# Patient Record
Sex: MALE | Race: WHITE
[De-identification: names, ages, dates, MRNs, and addresses within clinical notes are randomized per-mention and may not be internally consistent; named-entity substitution may affect disease eponyms.]

---

## 2021-09-25 ENCOUNTER — HOSPITAL ENCOUNTER (INPATIENT)
Dept: HOSPITAL 46 - ED | Age: 71
LOS: 6 days | Discharge: NURSING, REHAB OR LONG-TERM CARE | DRG: 177 | End: 2021-10-01
Attending: INTERNAL MEDICINE | Admitting: INTERNAL MEDICINE
Payer: OTHER GOVERNMENT

## 2021-09-25 VITALS — WEIGHT: 148.59 LBS | HEIGHT: 64 IN | BODY MASS INDEX: 25.37 KG/M2

## 2021-09-25 DIAGNOSIS — G89.29: ICD-10-CM

## 2021-09-25 DIAGNOSIS — U07.1: ICD-10-CM

## 2021-09-25 DIAGNOSIS — Z79.899: ICD-10-CM

## 2021-09-25 DIAGNOSIS — F03.90: ICD-10-CM

## 2021-09-25 DIAGNOSIS — F39: ICD-10-CM

## 2021-09-25 DIAGNOSIS — J96.01: ICD-10-CM

## 2021-09-25 DIAGNOSIS — F11.20: ICD-10-CM

## 2021-09-25 DIAGNOSIS — G92.8: ICD-10-CM

## 2021-09-25 DIAGNOSIS — J69.0: Primary | ICD-10-CM

## 2021-09-25 DIAGNOSIS — N40.0: ICD-10-CM

## 2021-09-25 DIAGNOSIS — N17.9: ICD-10-CM

## 2021-09-25 PROCEDURE — G0378 HOSPITAL OBSERVATION PER HR: HCPCS

## 2021-09-25 PROCEDURE — C9803 HOPD COVID-19 SPEC COLLECT: HCPCS

## 2021-09-25 PROCEDURE — U0003 INFECTIOUS AGENT DETECTION BY NUCLEIC ACID (DNA OR RNA); SEVERE ACUTE RESPIRATORY SYNDROME CORONAVIRUS 2 (SARS-COV-2) (CORONAVIRUS DISEASE [COVID-19]), AMPLIFIED PROBE TECHNIQUE, MAKING USE OF HIGH THROUGHPUT TECHNOLOGIES AS DESCRIBED BY CMS-2020-01-R: HCPCS

## 2021-09-26 NOTE — EKG
[facility]
                                    [address] [facility] [address]
                                  [place], [place]  [address]
_________________________________________________________________________________________
                                                                 Signed   
 
 
Normal sinus rhythm
Nonspecific T wave abnormality
Prolonged QT
Abnormal ECG
No previous ECGs available
Confirmed by MEL ROSE DO ([id number]) on 9/26/2021 5:31:58 PM
 
 
 
 
 
 
 
 
 
 
 
 
 
 
 
 
 
 
 
 
 
 
 
 
 
 
 
 
 
 
 
 
 
 
 
 
 
 
 
    Electronically Signed By: MEL ROSE DO  09/26/21 1732
_________________________________________________________________________________________
PATIENT NAME:     SUMMER AMIN                         
MEDICAL RECORD #: [id number]                     Electrocardiogram             
          ACCT #: [id number]  
DATE OF BIRTH:   [birth year]                                       
PHYSICIAN:   MEL ROSE DO                     REPORT #: [id number]
REPORT IS CONFIDENTIAL AND NOT TO BE RELEASED WITHOUT AUTHORIZATION

## 2021-09-26 NOTE — NUR
PT APPEARS TO BE FEELING A LITTLE AGITATED, PATIENT CONCERNED THAT HE WILL BE
WITHOUT HIS "PILL" PATIENT ASKED IF HE IS REFERRING TO HIS METHADONE, SAYS
"YES" AND HE HAS BEEN TAKING "IT FOR A LONG TIME, DON'T LIKE HOW IT FEELS WHEN
WITHOUT IT" PT HAS PRN ORDER FOR OXYCODONE 5MG AS NEEDED FOR PAIN OR
WITHDRAWL. SEE EMAR. PT MEDICATED. CONTINUING TO REINFORCE TO PATIENT TO KEEP
OXYGEN ON. PT CAN BE HEARD HAVING NUMEROUS COUGHING EPISODES.

## 2021-09-26 NOTE — NUR
AGITATED, ATTEMPTIMG TO GET OOB. TRYING TO REASON WITH PATIENT. PATIENT NOW
SITTING AT BED SIDE. RN IN ROOM FOR PATIENT SAFETY. NO RESP DISTRESS NOTED.
YANG CATH PATENT. IVF ON HOLD AT THIS TIME AS PATIENT PULLING AT IV TUBING.

## 2021-09-26 NOTE — NUR
PATIENT REMAINS AWAKE WATCHING TV, REINFORCED TO PATIENT TO TRY TO KEEP HIS
NASAL CANNUAL IN AS HIS SATS WILL GO DOWN TO 88% AT TIMES. PT DENIES ANY
NEEDS. DENIES PAIN AND NAUSEA. ATENDS ARE CLEAN AND DRY. PT SIPPING WATER.

## 2021-09-26 NOTE — NUR
PT CONTINUES TO GRAB AT CARDIAC LEADS AND MESS WITH HIS OXMETER ON FINGER AS
WELL AS NASAL CANNUAL. REPLACED THE CARDIAC LEAD ELECTRODE PADS. REINFORCED TO
PATIENT THE NEED FOR THESE TO SATY ON PATIENT VERBALIZED "OK" PT IS NOT
COMBATIVE. PT HAS ALSO HAD ANOTHER WET ATTENDS.

## 2021-09-26 NOTE — NUR
PATIENT ASSESSMENT COMPLETE. WHEN THIS RN WENT INTO ROOM PATIENT WAS NOT
WEARING HIS NASAL CANNULA SATS WOULD FLUCTUATE FROM 88% TO LOW 90'S,
ENCOURAGED PATIENT TO KEEP IT ON. PATIENT HAS VOIDED  A LARGE AMOUNT IN HIS
ATTENDS. CLEANED PT AND APPLIED DRY ATTENDS. EDUCATED PT ON USING CALL LIGHT
AS HE FELS NEED TO VOID TO ATTEMPT TO USE URINAL.  PT GIVEN WATER TOD RINK
WITH EVENING MEDS. DENIES PAIN. PATIENT CONTINUES TO PULL AT WIRES AND CORDS
INSTRUCTED TO NOT TAKE THESE OFF.

## 2021-09-26 NOTE — NUR
INCREASED OXYMASK SETTING TO 7 LITERS TO MAINTAIN SATS IN THE 90'S. PT WILL
FALL ASLEEP AND HIS SATS WILL DROP INTO THE HIH 80'S ON 5 LITER OXYMASK.

## 2021-09-26 NOTE — NUR
PULLED OFF LEADS, PULLING AT YANG CATH. IVF REMAIN HELD DUE TO PATIENTS
FRUSTRATION. PATIENT WILL TALK AND ABLE TO UNDERSTAND WHAT HE IS SAYING BUT IS
NOT MAKING SENSE MOST OF THE TIME. MORPHINE 2 MG IV GIVEN AS PER ORDERS.

## 2021-09-26 NOTE — NUR
PT ASSESSMENT COMPLETE. WHEN THIS RN WENT INTO ROOM, PATIENT HAD HIS OXYMASK
OFF HIS FACE, SATS DROPPED INTO MID 80'S. REINFORCED TO PATIENT THAT HE NEEDS
OXYGEN AT THIS TIME AND THAT HE NEEDS TO KEEP O2 IN PLACE TO MAINTAIN HIS
SATS. PT VERBALIZED UNDERSTANDING, BUT CONTNUES TO GRAB AT MASK, ALSO HAS BEEN
FIGETING WITH SAT PROBE TO FINGER.  KEPT OXYMASK AT 7 LITERS.

## 2021-09-26 NOTE — NUR
REFUSED DINNER. DENIES PAIN. HAS BEEM MORE CALM SINCE AROUND 1600 TODAY.
CONTINUES WITH HARSH COUGH. NO FUTHER CHANGES.

## 2021-09-26 NOTE — NUR
REPORT RECEIVED FROM RICK MORRELL FROM ER. REPORTS THAT PT HAS DEMENTIA HAS
CAREGIVER. PT HAD BM IN ER, IS INCONTINENT. HAS YANG IN PLACE. ON OXY MASK TO
KEEP SATS IN THE 90'S. PT BROUGHT TO CCU BY NURSING SUPERVISOR TONYA. PATIENT
WAS BEING TRANSFERRED TO BED AND HAD SMALL SOFT FORMED STOOL. PT CLEANED AND
PLACE ON CCU BED.

## 2021-09-26 NOTE — NUR
RECEIVED REPORT FROM DAY SHIFT NURSES. MARISOL HAD DECENT MORNING BUT IN THE
AFTERNOON WAS TRYING TO GET OUT OF BED PULL IV'S AND CORDS. YANG WAS REMOVED
AT 1400 HAS NOT VOIDED YET HAS ATTENDS IN PLACE. PT WAS GIVEN MORPHINE TO
RELAX HIM AND HE TOOK HIS USUAL PO MEDS. HAS 2 LO2 AT THIS TIME FOR ROOM AIR
SAT 88%, PT HAD BEEN OF O2 FOR A GOOD PART OF DAY. NICOTINE PATCH IN PLACE.
BED LARM ON.

## 2021-09-26 NOTE — NUR
REFUSING TO TAKE PO MEDICATIONS. DR. ROSE UPDATEDE VIA PHONE REGARDING
PATIENT AGITATION AND CONFUSION. ORDERS RECIEVED.

## 2021-09-26 NOTE — NUR
CONTINUES TO SIT AT BEDSIDE. NOT ON MONITOR,OXIMETER, OR IVF. GIVING PATIENT
TIME TO RELAX. HAS BEEN PULLING AT IV SITES AT TIMES. RN IN ROOM.

## 2021-09-27 NOTE — NUR
PT INSISTED ON STANDING TO PEE GOT PFF BED DESPITE SATTF TELLING HIM HE NEES
TO WAIT FOR ASSISTANCE, PT IS STILL AGITATED. DEB RN GAVE REST OF HALDOL IV
3MG FOR HIS AGITATION. AR STILL SPEAKING LOAUD AND AGITATED, CONFUSE NOT
ANTING TO GET BACK IN BED PT DID VOID IN URINAL.

## 2021-09-27 NOTE — NUR
RECEIVED REPORT FROM [facility]. pt RESTING IN BED, QUIET MOVEMENTS AT THIS
TIME. PRECEDEX GTT AT 0.6 HR 60 - 70S. SOME ECG LEADS OFF. IVF AND MAG
INFUSING PER ORDERS. BED ALARM ON.

## 2021-09-27 NOTE — NUR
IN TO DO ASSESSMENT. pt SITTING UP IN BED. IMMEDIATELY STATED "GET OUT OF
HERE" OFFERED MEDICATIONS pt AGREABLE TO TAKE. PICKED OUT THE ANTIBIOTIC AND
HANDED IT BACK, LOUDLY REFUSED WHEN EDUCATION WAS DONE. pt PULLED ALL LEADS
OFF. PRECEDEX TITRATED TO 0.8MCG/KG/HR. pt UNCOOPERATIVE WITH CARES. PUSHED
THIS RN AWAY AND ASKED LOUDLY TO BE LEFT ALONE. ABLE TO REPLACE A FEW ECG
ELECTRODES. HR 50'S. TITRATED PRECEDEX TO 0.7MCG/KG/HR TO MAINTAIN HR >50. pt
CONTINUES TO MOVE QUIETLY IN BED. WAKES QUICKLY TO TOUCH AND ASKS TO BE LEFT
ALONE. LIMITED ASSESSMENT DONE. IV IN RIGHT FOOT PATENT, BLOOD RETURN NOTED,
FLUSHED WELL. RESPIRATIONS REGULAR, pt REFUSES O2 SAT AND OXYGEN. pt ABLE TO
MOVE ALL EXTREMITIES INDEPENDENTLY. CURRENTLY RESTING IN BED FLAT SNORING AT
TIMES. WAKES TO LIGHT NOISE IN ROOM AND ASKS TO BE LEFT ANGELIQUE. HR IRREGULAR ON
MONITOR. PRECEDEX TITRATED TO 0.6 MCG/KG/HR AS pt IS NOW SETTLED WITH NIGHTLY
MEDICATIONS. BED ALARM ON.

## 2021-09-27 NOTE — NUR
NO IV SITES UPON THIS ASSESSMENT. IV SITES WERE LOST PRIOR TO THIS SHIFT. WILL
ATTEMPT ANOTHER IV SITE WHEN PATIENT MORE RELAXED. PATIENT IS VERY ANGERY AND
AGITATED AT THIS TIME.

## 2021-09-27 NOTE — NUR
PT GIVEN 2MG HALDOL IV PER DR ROSE VERBAL ORDER FOR AGITATION, PATIENT HAS
BEEN ASSISTED BACK TO BED BY JAYDE FROM SECURITY. PT CONTINUES TO SPEW
PROFANITIES AND MAKE THREATS, BUT IS NOT TRYING TO PHYSICALLY HURT STAFF, PT
APPEARS PARANOID.

## 2021-09-27 NOTE — NUR
in room now that patient has relaxed after haldol. assessed patient's iv's due
to his wreslting around iv's are no longer patent blood at sites. removed both
iv's to right hand placed guaze and coban on sites. patient is uncooperative
and shouting at this RN, attempted to do a straight stick but patient is more
irrate and states "you guys aren't touching me" "i don't need that shit"
unable to collect labs. patient does not have iv at this time.

## 2021-09-27 NOTE — NUR
HALDOL 5 MG IV GIVEN WHILE PATIENT STANDING UP IN BATHROOM. 2 RNS IN ROOM TO
TRY AND DIRECT PATIENT.

## 2021-09-27 NOTE — NUR
BED ALARM GOING OFF. PATIENT STANDING AT BEDSIDE SAYING HE NEEDS TO PEE, INC
OF SOME URINE, ALSO VOIDED TO URINAL. HAVING DIFFICULTY MANAGING HIMSELF. IS
EXTREMELY FRUSTRATED, ANXIOUS, AGITATED. HALDOL 5 MG IV GIVEN.

## 2021-09-27 NOTE — NUR
DEIDRA RN WAS IN ROOM WITH PATIENT WHO WAS TRYING TO GET OUT OF BED AND BED
ALARM WENT OFF YELLING "I HAVE TO PEE" HOWEVER PT SOILED HIS ATTENDS. PT
APPEARS TO BE VERY AGITATED WANTS TO STAND AND WALK AROUND ROOM. PATIENT
INFORMED HE HAS COVID AND IS IN THE HOSPITAL AND WEAK, IT IS NOT SAFE FOR HIM
TO BE WALKING AROUND ROOM BY SELF. PT VERY AGITATED AND SPEWING PROFANITIES AT
STAFF. REFUSING TO GET BACK IN BED. PT HAS COUGHING EPISODE WHILE SITTING ON
BED AND SATS DROPPED, PT INSTRUCTED TO KEEP HIS O2 ON, HE KEEPS TAKING IT OFF.
JAYDE FROM [employer] IN ROOM DUE TO PATIENT ESCALATING

## 2021-09-27 NOTE — NUR
Pt left a voice message requesting a refill. Last visit 03/22/2021 MAMIE Cantu   Next appointment 06/24/2021 NP Claire Cantu   Previous refill encounter(s)   01/13/2021 Januvia #[id number]     Requested Prescriptions     Pending Prescriptions Disp Refills    SITagliptin (Januvia) 100 mg tablet 90 Tab 1     Sig: Take 1 Tab by mouth daily. assessment done as patient as patient awake and attempting to get oob.
temp-100.6.

## 2021-09-27 NOTE — NUR
PATIENT SEVERELY AGITATED AND ABUSIVE TOWARD STAFF. SECURITY CALLED. ATTEMPTED
TO REDIRECT PATIENT FAILED. PATIENT'S NEXT OF KIN CALLED WITH NO ANSWER.
DISCUSSED WITH MD. RECEIVED ORDERS FOR PRN HALDOL. REVIEW ORDERS IN EMAR.

## 2021-09-27 NOTE — EKG
[facility]
                                    [address] [facility] [address]
                                  [place]  [address]
_________________________________________________________________________________________
                                                                 Signed   
 
 
Normal sinus rhythm
Normal ECG
When compared with ECG of 25-SEP-2021 21:24,
Nonspecific T wave abnormality no longer evident in Inferior leads
Nonspecific T wave abnormality no longer evident in Anterior leads
Confirmed by MEL ROSE DO ([id number]) on 9/27/2021 8:10:42 PM
 
 
 
 
 
 
 
 
 
 
 
 
 
 
 
 
 
 
 
 
 
 
 
 
 
 
 
 
 
 
 
 
 
 
 
 
 
 
 
    Electronically Signed By: MEL ROSE DO  09/27/21 2011
_________________________________________________________________________________________
PATIENT NAME:     SUMMER AMIN                         
MEDICAL RECORD #: [id number]                     Electrocardiogram             
          ACCT #: [id number]  
DATE OF BIRTH:   [birth year]                                       
PHYSICIAN:   MEL ROSE DO                     REPORT #: [id number]
REPORT IS CONFIDENTIAL AND NOT TO BE RELEASED WITHOUT AUTHORIZATION

## 2021-09-27 NOTE — NUR
REMAINS VERY DIFFICULT TO CARE FR AS WILL NOT COOPERATIVE WITH TREATMENT.
SITTING UP IN BED, YELLING OUT DON'T TOUCH ME. TUGGING AT IV TUBING. O2 OFF,
OXIMETER OFF. I CAN NOT REAPPY AT THIS TIME AS PATIENT AGITATED.

## 2021-09-27 NOTE — NUR
IN TO ASSESS. pt RESTING FLAT IN BED. WOKE TO VOICE. ALLOWED THIS RN TO TAKE
BP AND REPLACE LEADS. DID NOT ACTIVELY MOVE AWAY. DID NOT ALLOW ANY OTHER
INTERVENTIONS, REQUESTED TO BE LEFT ALONE. pt DENIED PAIN AT THIS TIME. BED
ALARM ON.

## 2021-09-27 NOTE — NUR
INC OF URINE. SITTTING AT BEDSIDE. UABLE TO REASON WITH PATIENT. YELLS OUT,
LEAVE ME ALONE, DON'T TOUCH ME! I DONT' WANT ANY HELP. THEN STOOD. VERY
UNSTABLE ON FEET.

## 2021-09-27 NOTE — NUR
report recieved. patient is resting in bed at this time. not awakened for
assessment. no distress noted.

## 2021-09-27 NOTE — NUR
PT ASLEEP ON BACK PATIENT WILL PULL NASAL CANNULA OFF SATS WILL MAINTAIN IN
THE 90'S. HR  IN THE 60'S, RR 20. VISUALIZED EVEN RISE AND FALL OF CHEST.

## 2021-09-27 NOTE — NUR
awake, BED ALARM GOING OFF. PATIENT STANDING AT BEDSIDE SAYING HE NEEDS TO
PEE. INC OF URINE. WAS ABLE TO VOID SMALL AMOUNT OF URINE TO URINAL. NOT ABLE
TO DIRECT PATIENT. PATIENT IS VERY UNSTEADY ON FEET. WAS FINALLY ABLE TO GET
PATIENT BACK TO BED. I ATTEMPTED 3 TIMES TO START IV W/O SUCCESS. SUPERVISIOR
HERE TO START IV AND DRAW LABS. THIS PROCESS TOOK APPROX 2.5 HOURS AS PATIENT
WAS UP WALKING IN ROOM, WAS HOLDING ON TO BED, BEDSIDE STAND,WALLS, BATHROOM
RAILS. WHEN ATTEMPTING TO HELP PATIENT, PATIENT WOULD YELL OUT "LEAVE ME
ALONE,GET YOUR HANDS OFF OF ME" PATIENT IS VERY FRUSTRATED.

## 2021-09-27 NOTE — NUR
PT CONTINUES TO BE VERBALLY AGGRESSIVE AT STAFF REFUSING TO GET BACK ON THE
BED. STATES "DON'T PUT YOUR FUCKING HANDS ON ME!" THIS RN AND JAYDE FROM
SECURITY ENCOURAGING PT TO GET BACK ON THE BED FOR HIS SAFETY.

## 2021-09-28 PROCEDURE — 8E0ZXY6 ISOLATION: ICD-10-PCS | Performed by: INTERNAL MEDICINE

## 2021-09-28 NOTE — NUR
BED ALARM SET OFF.  IN TO ASSIST PT WHO STOOD AT SIDE OF BED TO VOID AND THEN
RETURNED TO BED.  WARM BLANKET PROVIDED PER REQUEST.  BED ALARM SET FOR
SAFETY.

## 2021-09-28 NOTE — NUR
THIS RN IN PTS ROOM TO SWITCH FELICE RN. PT WANTING TO SIT ON EDGE OF BED. PT
APPEARS ANXIOUS AND AGITATED. THIS RN ABLE TO GET PT BACK INTO BED AFTER
FEEDING HIM A COUPLE BITES OF APPLESAUCE AND A FEW SIPS OF WATER- BEDSIDE
SWALLOW EVAL- WHEN PT WAS LAID BACK INTO BED HE HAD A COUGHIN GFIT THAT
REQUIRED HIM TO SIT BACK UP. EVENTUALLY PT APPEARED TIRED ENOUGH AND WAS ABLE
TO LAY BACK DOWN WITH NO COUGHING FIT.

## 2021-09-28 NOTE — NUR
BLADE AND THIS NURSE ASSISTED PT. WITH EXERCISES. PT. VOIDED 100ML AND
INCONTINENT VOID ON CHUX. PT. ABLE TO STAND SEVERAL TIMES WITH 2PA AND FWW.
PT. REFUSING BP AND TEMP CHECKS, CARDIAC MONITOR. PT. ASSISTED WITH EATING
SNACK. LEFT RESTING WITH BED ALARM ON AND CURTAIN OPEN.

## 2021-09-28 NOTE — NUR
PATIENT IS NOT ABLE TO HAVE A CONVERSATION VIA PHONE PER STAFF.  CALLED AND
SPOKE WITH WIFE MARCO EVANS.  WE DISCUSSED HER CONCERNS, QUESTIONS, AND WHAT
IS IN PLAY WITH [facility].  PATIENT SEEMS CLOSE TO BASELINE WITH
HIS MENTATION.  SHE STATES SHE HAS BEEN WORKING WITH  AT
[facility] FOR POSSIBLE HELP WITH CARE AT HOME.  SHE STATES PATIENT HAS
DEMENTIA, IS A ADDICT USING OPIODS AND ALCOHOL IN THE PAST.  HE IS CURRENTLY
ENROLLED AT [facility] AND THEIR [facility].  SHE STATES SHE DOES ALL
HIS MEDICATIONS AND MONITORS HIM VERY CLOSELY WITH THEM.  SHE STATES THEY
MOVED HERE FROM THE [facility] AND SHE HAS BEEN TRYING TO GET HIM INTO THE
[facility] T[facility] CL[facility]IC IN [place] SO THEY DON'T HAVE TO TRAVEL TO
[place].  SHE STATES SHE HAS HAD TROUBLE GETTING THROUGH WITH CLINIC HERE.
SHE STATES SHE HAS BEEN WORKING TOWARDS GETTING HELP IN THE HOME.  SHE IS WITH
PATIENT 24/7.  SHE STATES HE CANNOT BE LEFT ALONE AT ALL.  SHE STATES HE HAS
DEMENTIA, IS AN ADDICT, AND WAS INCARCERATED FOR YEARS.  HE IS PTSD.  HE SEES
A NEUROLOGIST IN [facility] DR [place]TIZ X [phone] AND HAD A NEURO ASSESSMENT
AND SCAN A WEEK AGO.  HE ALSO WORKS WITH PSYCHIATRIST DR PAULA ELISE
[facility] X [phone].  HER ASSISTANT IS ELIJAH BLUE [id number].  PATIENT GOES
THROUGH ORTEGA SCOTT AT [facility] OPIOD CL[facility]IC [id number].  THE COMMUNITY
COORDINATOR SHE HAS BEEN WORKING WITH FROM [facility] IS PENNIE JURADO X
[id number].  SHE STATES THEY DID A GEC RECENTLY.
 
SHE STATES SHE CAN TAKE PATIENT HOME IF HE IS ABLE TO AMBULATE AND GET TO
BATHROOM ETC.  SHE IS VERY CONCERNED ABOUT DOSING OF [facility].  SHE HAS MANY
QUESTIONS REGARDING THIS.  I WAS ABLE TO ANSWER MOST BY CHART NOTES.  SHE IS
NOT SURE PATIENT HAS COVID.  STATES THEY HAVE AVOIDED CONTACT WITH PEOPLE,
USES MANY SANITIZERS AT HOME AND ALWAYS WEAR A MASK.  SHE STATES THEY ONLY
THING THEY HAVE DONE RECENTLY IS THE [facility] VISITS, AND THEY WENT TO [place]OUND[place]
LOCAL [place] LAST THURSDAY.  SHE STATES THEY BOTH WORE A MASK THERE.  SHE IS
CONCERNED THE TEST IS WRONG.  SHE HAS GONE TO GET TESTED, HAS NOT GOT RESULTS
BACK YET.  SHE STATES SHE WANTS PATIENT TO BE TESTED AGAIN, AND NOT A RAPID
TEST.
SHE ALSO STATES PATIENTS MEDICAL TEAM IN [place] IS THE STRENGTH TEAM.
THIS INCLUDES PCP BENNIE SCHOFIELD AND RN [facility].
 
WIFE VENTED MANY FRUSTRATIONS AND CONCERNS.  CONVERSATION WAS 1.5 HOURS.  I
DISCUSSED I WILL TRY TO CONTACT [facility] TO SEE WHERE THEY ARE AT WITH COMMUNITY
RESOURCES.  ALSO THAT OUR HOSPITALIST CHANGE TODAY AND DR ACUNA WILL BE HERE
AND TOMORROW WE WILL CALL HER TO UPDATE HER.  SHE STATES SHE IS MUCH RELEAVED
TO HAVE GOTTEN MY CALL AND SHE IS LOOKING FORWARD TO SPEAK WITH US AGAIN
TOMORROW.

## 2021-09-28 NOTE — NUR
PT ATTEMPTING TO GET OUT OF BED. THIS NURSE IN THE ROOM TO ASSIST. PT. REFUSES
HELP. WALKER GIVEN TO PT. HE HAS DIFFICULTY STANDING AND STATES HE NEEDS TO
VOID SITTING AT EOB WITH STAFF IN THE ROOM. ON RA AND O2 SAT REMAINS GREATER
THAN 90%.

## 2021-09-28 NOTE — NUR
BED ALARMING. pt UP TO URINATE WALKED TO BATHROOM UNSTEADY ON FEET. BACK TO
BED. REPEATED "I'M FINE, I CAN DO IT." REFUSED ASSISTANCE. ABLE TO VOID TWICE.
SOME INCONTINENCE. RESTING IN BED WITH BED ALARM ON.

## 2021-09-28 NOTE — NUR
THIS RN ORGINALLY IN PTS ROOM TO START PTS ANTIBIOTICS. PT THEN WOKE UP AND
STATED THAT HE NEEDED TO PEE. THIS RN USED COLLINDER AND PT DRIBBLED 50ML INTO
IT. PT THEN LAID BACK DOWN, THIS RN DID ASSESSMENT, PT THEN STATED THAT HE
WANTED TO GET UP TO PEE. THIS RN ASSISTED PT TO EDGE OF BED, PT THEN REFUSED
TO STAND UP TO PEE- PT SAT ON EDGE OF BED FOR 10MINS PRIOR TO LAYING BACK
DOWN, PT APPEARED COLD. THIS RN TOOK PTS TEMP- 99.6 ORALLY. THIS RN ALSO TOOK
PTS BP- PTS ARMS SHAKING DURING PROCESS- DIASTOLIC HIGH FOR EVERY BP TAKEN. PT
APPEARED EXHAUSATED AND HENCE WHY PT DID NOT GET OUT OF BED.
PT THEN STATED THAT HE NEEDED TO PEE AGAIN, THIS RN ASSISTED PT TO PEE USING
COLLINDER WHILE IN BED, PT AGAIN PEED 50, PTS BLADDER FEELS FULL- JACINTO RN
DISCUSSED WITH CLARA FOR THIS RN TO STRAIGHT CATH PT
THIS RN STRAIGHT CATHED PT- PT DID NOT TOLERATE WELL AND STERILE FEILD WAS
BROKEN BY PT DUE TO NONCOMPLIANCE AND UNABLE TO UNDERSTAND- PT KEPT YELLING
"YOU FUCKING BITCH" AND "I GOTTA PEE" THIS RN ABLE TO OBTAIN 250ML URINE OUT,
PT EVENTUALLY ABLE TO SETTLE DOWN AFTER BLADDER EMTIED. THIS RN DID MEET
RESISTANCE DURING STRAIGHT CATH PROCEDURE.

## 2021-09-28 NOTE — NUR
PATIENT BED ALARM ACTIVATED. PATIENT REFUSED TO LIE DOWN. PATIENT WANTED TO
SIT ON SIDE OF THE BED. PATIENT WAS SUPERVISED FOR THE NEXT HOUR UNTIL NURSE
CAME IN TO GIVE MEDS AND PATIENT LIED DOWN.

## 2021-09-28 NOTE — NUR
REPORT RECEIVED FROM NIGHT RN AND PT. CARE RESUMED. BED ALARM SOUNDING. PT. IS
STANDING. PT. TOLD THIS NURSE HE DOES NOT NEED HELP AND TO LEAVE. PT. ASSISTED
WITH AMBULATING A SHORT DISTANCE AND THEN VOIDING 100ML IN CYLINDER. HE ALSO
HAD AN INCONTINENT SMALL VOID AND CHUX CHANGED. PT. IS ORIENTED TO SELF ONLY.
HE REFUSES CARDIAC MONITOR, BP AND TEMP. 02 SAT IS 90% ON RA. IV SITES APPEAR
WNL AND SL. LUNGS DIM. IN THE BASES. ALL MORNING MEDS TAKEN EXCEPT LOVENOX.
PT. AGGITATED AT TIMES AND STATED "THAT'S ENOUGH BITCH". THIS NURSE TOLD THE
PATIENT HIS LANGUAGE WAS NOT APPROPRIATE AND HE STATES "OK, SORRY".  PT. LEFT
RESTING WITH BED ALARM ON AND CURTAIN OPEN.

## 2021-09-28 NOTE — NUR
PATIENT BED ALARM ACTIVATED. PT. TRYING TO LEAVE BED TO USE BATHROOM. PATIENT
ASSISTED WITH BR ASSISTANCE.
BRIEF CHANGED, ROOM TIDIED, BED ALARM ACTIVATED.

## 2021-09-28 NOTE — NUR
TRIED MULTIPLE TIMES TODAY TO GET THROUGH TO THE [facility].  HAD OUR CHW
TRY ALSO AND SHE WAS NOT ABLE TO GET THROUGH.  SHE CALLED [facility] AND THEY
ARE CHECKING ON PHONE ISSUE.

## 2021-09-28 NOTE — NUR
THIS CNA IN ROOM TO ASSIST RN FELICE. PATIENT UP TO BSC, UNABLE TO HAVE BM. BACK
TO BED, WARM BLANKETS PROVIDED. BED ALARM ON FOR SAFETY. CALL LIGHT IN REACH

## 2021-09-28 NOTE — NUR
BED ALARMING. THIS RN IN TO ASSIST. pt SITTING ON SIDE OF BED. DENIES NEEDS.
SLEEPING AT TIMES. BASHIR CNA IN ROOM 1:1 WITH pt.

## 2021-09-28 NOTE — NUR
BED ALARMING. IN TO ASSIST. pt IMMEDIATELY SHOUTED "I'M FINE I DON'T NEED
HELP" SL FOR pt SAFETY. pt WAS ABLE TO VOID IN URINAL WITHOUT ASSISTANCE. GOT
HIMSELF BACK TO BED. ALLOWED THIS RN TO TAKE TEMPERATURE. TRIED A BLOOD
PRESSURE BUT pt SHOUTED "IT HURTS STOP IT!" pt REFUSED TO BE ASSESSED. ALL
MONITORS OFF AT THIS TIME. RESTING IN BED WITH BED ALARM ON.

## 2021-09-28 NOTE — NUR
PT. WAS ATTEMPTING TO STAND. THIS NURSE ASSISTED PT. WITH STANDING. HE COULD
NOT STATE WHERE HE WANTED TO GO AND WAS UNABLE TO TAKE STEPS. SAT AT THE EOB
FOR SEVERAL MINUTES. 02 SAT WAS 92% ON RA. PT. ASSISTED WITH REPOSITIONING IN
BED. LEFT RESTING WITH ALARM ON.

## 2021-09-28 NOTE — NUR
THIS RN BACK IN PTS ROOM WITH TREVA HENRY DUE TO PT ATTEMPTING TO GET OUT OF
BED. PT WAS INCONTINENT OF URINE AT THIS TIME. COMPLETE BED CHANGE DONE, PT
TOELRATED OKAY- PT UNABLE TO VOID INTO CYLINDER BUT WAS ABLE TO STAND TWICE TO
ALLOW BED CHANGE. PT BACK TO BED AND APPEARS TO BE MORE COMFORTABLE AT THIS
TIME WITH WARM BLANKETS PLACED FOR COMFORT.
PT NOT AGREEABLE TO PULSE OX AT THIS TIME.

## 2021-09-28 NOTE — NUR
RECEIVED REPORT FROM LANA MORRELL. pt WAS RECENTLY UP TO VOID AND BACK TO BED. CNA
IN ROOM TO ASSIST. BED ALARM ON FOR SAFETY.

## 2021-09-28 NOTE — NUR
PT. HAD AN INCONTINENT VOID IN BED AND ALSO VOIDED 50ML IN CYLINDER. PT.
STATES HE HAS TO HAVE BM. THIS NURSE ASSISTED PT. TO THE BEDSIDE COMMODE, BUT
HE WOULD NOT AMBULATE OR LET GO OF THE BEDRAIL AND DID NOT WANT TO BE TOUCHED.
2PA TO THE COMMODE. BEDDING CHANGED AND PT. ASSISTED WITH WASHING HANDS AND
FACE. LEFT RESTING IN BED WITH ALARM ON AND CURTAIN OPEN.

## 2021-09-28 NOTE — NUR
pt MOVING IN BED. THIS RN IN TO ASSIST. pt HAD TO URINATE. ASSISTED TO SIDE OF
BED. pt WAS INCONT OF URINE. CHUX CHANGED. pt PUSHED WITH RN AWAY STATING "I
DON'T NEED HELP I'M FINE" VERY UNSTEADY ON FEET. VOIDED INTO URINAL. pt BACK
TO BED. REFUSED TO COOPERATE WITH CARES. REQUESTED STAFF LEAVE HIM ALONE. IVF
INFUSING. RIGHT FOOT IV SL. BED ALARM ON.

## 2021-09-28 NOTE — NUR
PT. REQUESTED TO HAVE UNDERWEAR ON. ASSISTED WITH PLACING ATTENDS ON.
PT. HELPED WITH
REPOSITIONING AND BROUGHT BLANKETS. DENIES FURTHER NEEDS. PT. LEFT RESTING
WITH ALARM ON AND CURTAIN OPEN.

## 2021-09-28 NOTE — NUR
ROUNDED ON pt. RESTING IN BED, RESPIRATIONS REGULAR AND UNLABORED. ALL
MONITORS REMAIN OFF PER pt REQUEST. BED ALARM ON.

## 2021-09-28 NOTE — NUR
this rn updated pts wife/ caregiver. she was unhappy that pt had been placed
on pain meds. this rn discussed with her that she could come in and sit with
pt due to pt getting agitated when he has to pee, she stated she can come for
a bit in the am

## 2021-09-28 NOTE — NUR
BED ALARMING. pt TRYING TO SIT UP. INCONT OF URINE WAS ABLE TO STAND A VOID
SOME IN URINAL. SITTING ON EDGE OF BED. WAS ABLE TO TAKE MEDICATIONS AT THIS
TIME. BASHIR, CNA IN ROOM WITH pt.

## 2021-09-28 NOTE — NUR
pt SET OFF BED ALARM CNA IN ROOM TO ASSIST. pt ABLE TO SIT ON THE SIDE OF THE
BED, CONTINUES TO REPEAT "I'M OKAY, I DON'T NEED HELP" AFTER A TIME pt WAS
ABLE TO SETTLE SELF BACK IN BED. LIMITED ASSESSMENT DONE. LUNGS CLEAR IN THE
UPPERS, RIGHT LOWER DIM. BOWEL TONES ACTIVE. pt DOES HAVE A DEPEND IN PLACE.
pt BECAME AGITATED WITH CARES, ANSWERED SOME QUESTIONS. REPORTED PAIN "ALL
OVER" OFFERED NIGHTLY MEDICATIONS. pt ASKED THAT MEDS BE PLACED ON TABLE. pt
RESTING IN BED WITH EYES CLOSED, RESPIRATIONS REGULAR. BED ALARM ON.

## 2021-09-28 NOTE — NUR
PT. BROUGHT BREAKFAST. HE ATE 25% WITH ASSISTANCE. PT. STATES HE DOES NOT NEED
TO VOID AT THIS TIME. LEFT RESTING WITH ALARM ON.

## 2021-09-28 NOTE — NUR
THIS RN IN PTS ROOM WITH BASHIR HENRY TO ASSIST PT WHO SET OFF THE BED ALARM TO
PEE. PT WAS INCONTINENT OF URINE AT THIS TIME AND DEMANDED TO STAND UP TO PEE.
PT WAS ONLY ABLE TO STAND FOR 2 MINS WITH NO PEE OUT PRIOR TO PT SITTING BACK
DOWN IN BED. THIS RN AND BASHIR REPOSITIONED PT IN BED. FLUIDS RESTARTED AT THIS
TIME

## 2021-09-29 NOTE — NUR
SPOKE WITH DR ACUNA AFTER SHE HAS SEEN PATIENT AND REVIEW CHART.  PATIENT IS
CONTINUING TO ASPIRATE PER TIFFANY BAILON.  PATIENT MAY POSSIBLY NEED MORE
WORK-UP AND RESOURCES THROUGH [facility] VIA TRANSFER TO ONE OF THEIR FACILITIES.
 
I CALLED [facility] [phone] AND SPOKE WITH YVES.  DISCUSSED DR ACUNA WOULD LIKE TO TALK WITH SOMEONE ABOUT POSSIBLE TRANSFER.  INFORMATION
GIVEN AND DR ACUNA'S PHONE GIVEN FOR THEM TO REACH.

## 2021-09-29 NOTE — NUR
pt ATTEMPTING TO STAND. LOUDLY STATED "I NEED TO PEE!" TRIED TO ASSIST pt
YELLED "STOP THAT!" INCONT OF URINE. THREE PERSON ASSIST TO CHAIR. pt
AGITATED. SKIN CARE DONE. pt WRAPPED IN WARM BLANKETS. FRESH SOCKS. pt NON
COMPLIANT WITH CARES. WHEN ASKED IF COMFORTABLE pt STATED "NO" BUT REFUSED TO
TRANSFER TO BED. CURRENTLY SITTING IN CHAIR WITH ELBOWS ON LEGS, EYES CLOSED,
RESPIRATIONS REGULAR. CHAIR ALARM ON. THIS RN DIRECT OBS.

## 2021-09-29 NOTE — NUR
pt SHIVERING. TEMPORAL TEMP 99.4, ATTEMPTED TO ADMINISTER TYLENOL. pt REPEATED
"NOT RIGHT NOW, NOT RIGHT NOW" DENIES NEED TO URINATE OR DESIRE TO CHANGE
POSITION. SITTING IN CHAIR ROCKING. CHAIR ALARM ON. STAFF DIRECT OBS.

## 2021-09-29 NOTE — NUR
IVF OF LR RESTARTED AT 50 ML/HR IN HOPS THAT PATIENT IS ABLE TO TOLERATED
FLUIDS INFUSING TO R INNER ANKLE SITE. PATIENT REMAINS NPO.

## 2021-09-29 NOTE — NUR
WAS ABLE TO GET THROUGH TO [facility] TODAY.  THEY STATED THEIR WHOLE
PHONE SYSTEM WAS DOWN YESTERDAY.
 
SPOKE WITH ALAN ELIZABETH  WHO THEY TRANSFERRED ME TO
BECAUSE ED [facility] WAS NOT AVAILABLE.  SHE STATES THAT PATIENTS [facility] DID GET
APPROVED FOR SERVICES AND HE WAS ASSIGNED A CASE MANAGEMENT TEAM.  STATES THEY
HAD REQUESTED RESPITE CARE SO THAT WIFE CAN TAKE A BREAK ON OCCASION.
DISCUSSED WITH HER THAT PATIENT MAY NEED MORE CARE THAN THAT AND SHE STATES
WIFE JUST NEEDS TO CALL ED [facility]ANN[facility] AND HE CAN HELP HER GET THINGS ARRANGED.

## 2021-09-29 NOTE — NUR
pt ATTEMPTING TO GET UP. IN TO ASSIST. pt UNSTEADY AND WEAK BUT ABLE TO GET TO
BED 1PA. BOOSTED IN BED. WARM BLANKETS APPLIED. BED ALARM ON. pt REFUSED TO
COOPERATE WITH ASSESSMENT. CURTAIN OPEN TO NURSES STATION.

## 2021-09-29 NOTE — NUR
BED ALARM SOUNDING, PATIENT ATTEPTING TO GET UP. THIS CNA IN ROOM. PATIENT
INCONTINENT OF URINE AND REQUESTS TO STAND AND USE URINAL, UNABLE TO VOID.
BACK TO BED. BED ALARM ON FOR SAFETY. BRACHIAL PULSE AND RESP. RATE CHARTED.
RN AWARE.

## 2021-09-29 NOTE — NUR
CALLED PATIENTS WIFE MARCO AND SHE WAS HERE IN HIS ROOM.  ASKED IF SHE WOULD
LIKE TO VISIT WITH MYSELF AND DR ACUNA TO UPDATE HER AND GET HER INPUT ON A
PLAN OF CARE.
 
WE MET WITH HER IN THE ICU IN THE HALLWAY.  DR ACUNA UPDATED HER AND SHE WOULD
LIKE TO PURSUE TRANSFER TO THE [facility] PERFERABLY.  SHE STATES THAT AFTER
BEING IN THE ROOM WITH HIM, SHE DOES NOT THINK SHE CAN TAKE HIM HOME AT THIS
TIME ANYWAY AND HE MAY NEED TO GO TO ONE OF THE [facility]S AFTER HIS
ACUTE CARE.
 
AFTER DR ACUNA LEFT FOR A MEETING I SPOKE WITH HER ANOTHER 30 MINUTES.  IN
THIS TIME SHE SAID THAT PATIENT HAS HAD FOUR DIRTY UA'S FOR HIS [facility]
PROGRAM.  SHE WOULD LIKE TO DISCUSS THIS WITH THEM WHEN HE IS IN [place].
SHE STATES FRUSTRATION THAT SHE HAS NOT BEEN ABLE TO GET HIM INTO A LOCAL
CLINIC.  SHE STATES THEY MIGHT CHANGE OVER TO [employer] SO THEY CAN GO TO [facility] FOR THAT.  DISCUSSED THAT AT THIS TIME WE WON'T BE CHANGING ANY OF
THAT.  IT WOULD MOST LIKELY BE DONE AFTER ACUTE STAY.  SHE IS FINE WITH THIS.
SHE ALSO STILL DOES NOT THINK PATIENT HAS COVID.  SHE STATES IF SHE IS
NEGATIVE THEN HOW CAN HE BE POSITIVE.  DISCUSSED THAT PEOPLE IN SAME HOME CAN
TEST DIFFERENTLY.  NOT EVERYONE TURNS POSITIVE AT SAME TIME.  SHE STILL
INSISTS THAT SHE IS VERY CAREFUL WITH MASKING AND CLEANING.  REMINDED HER SHE
SAID THEY WENT TO LOCAL [facility] 9/16 AND IT JUST TAKES ONE EXPOSURE TO CONTRACT
THIS VIRUS.  REMINDED HER AT THIS TIME HE DOESN'T SEEM TO HAVE MANY OF THE
SYMPTOMS, SO TIME WILL TELL HOW HE DOES REGARDING THIS.  BUT THAT WE HAVE TO
KEEP HIM IN QUARANTINE HERE AT THE FACILITY.
 
PHYSICAL THERAPIST ARRIVED AND WAS GOING INTO WORK WITH PATIENT.  I SUGGESTED
IF SHE WAS IN WITH THEM PATIENT MIGHT BE MORE COOPERATIVE.  SHE AGREED AND
LEFT TO GO BACK INTO THE ROOM.

## 2021-09-29 NOTE — NUR
STAFF CONTINUES TO BE 1:1 WITH pt. pt STATES "I'M ALRIGHT" NO REQUESTS.
SITTING IN CHAIR. CONTINUES TO LEAN FORWARD. CHAIR ALARM ON.

## 2021-09-29 NOTE — NUR
SLEEPING, MUCH LESS AGITATED TODAY. WILL OPEN EYES BRIEFLY THEN SHUT THEM
AGAIN. REMAINS OFF CARDIAC MONITOR. LR AT 50 ML HR INFUSING.

## 2021-09-29 NOTE — NUR
IN TO CHECK ON PT, STATES HE IS COLD, PULLED HIS BLANKETS UP. PRODUCTIVE
SOUNDING COUGH NOTED, CRACKLES REMAIN IN LUNG BASES. BED ALARM ON.

## 2021-09-29 NOTE — NUR
PT ATTEMPTING TO GET OUT OF BED, ASKED HIM IF HE NEEDED TO USE BR, HE FIRST
STATED YES THEN STATED NO HE WANTED TO GO TO BED. ASSISTED BACK TO BED, BED
ALARM ON.

## 2021-09-29 NOTE — NUR
IN TO GIVE CNA A BREAK. pt HAS BEEN SITTING ON EDGE OF BED. FALLING ASLEEP
AT TIMES. pt REFUSES TO LAY IN BED OR SIT IN CHAIR. COMBATIVE AND AGITATED
WITH TOUCH OR INSISTANCE OF REPOSITIONING. HAS BEEN ABLE TO SELF CORRECT WHEN
DRIFTING FORWARD, REQUIRES 1:1 IN ROOM FOR SAFETY. REFUSING TO ANSWER
QUESTIONS WITH MORE THAN "NO" CONTINUES TO STATE "I'M ALRIGHT". PROVIDED WARM
BLANKETS. BED ALARM ON. DIRECT OBS WITH STAFF.

## 2021-09-29 NOTE — NUR
PT AWOKE EASILY FOR ASSESSMENT AND HS MEDS. LUNGS HAVE CRACKLES IN RIGHT BASE,
OCCASIONAL COUGH. BPS ELEVATED, WILL CONT TO MONITOR.

## 2021-09-29 NOTE — NUR
MODIFIED ASSESSMENT DONE. REFUSING BP, OXIMETER READING-88. WILL NOT KEEP O2
ON. IS MORE CALM AT THIS TIME. REMAINS NPO. NO RESP DISTRESS NOTED.

## 2021-09-29 NOTE — NUR
PHYS THERAPY HERE TO WORK WITH PATIENT. PATIENT SAT AT BEDSIDE THEN
TRANSFERRED TO CHAIR WITH ASSIST OF 2 STAFF. SEE PHYS THERAPY NOTE.

## 2021-09-30 NOTE — NUR
PATIENT SITTING QUIETLY ON SIDE OF BED DRINKING COFFEE, FALL MATS ON FLOOR
NEXT TO BED FOR SAFETY.
WARM BLANKETS AND FRESH ICE WATER PROVIDED. VITALS CHARTED. PATIENT REFUSED
BP. PATIENT DENIES NEED TO URINATE AT THIS TIME. BED ALARM ON FOR SATEY, CALL
LIGHT IN REACH.

## 2021-09-30 NOTE — NUR
PT SITTING UP AT THE SIDE OF THE BED. OFFERED PT COFFEE, HE NODS HIS HEAD YES
TO THAT. COFFEE MADE AND TAKEN IN TO PT ROOM.

## 2021-09-30 NOTE — NUR
IN TO CHECK ON PT, AWAKE AT THIS TIME AND ASKING FOR ASSISTANCE TO SIT UP.
ASKED IF PT NEEDED TO URINATE, BUT HE SAID NO.  ATTENDS APPEAR DRY AT THIS
TIME.  PT REPORTS SOB AND WANTS TO SIT AT SIDE OF BED, BED ALARM SET AND FLOOR
PAD PLACED IN FRONT OF PT FOR SAFETY.  SPO2 88-90% ON RA, PT DECLINES OXYGEN
SUPPLEMENTATION.  HR REGULAR.  BOWEL TONES ACTIVE.  SKIN APPEARS GROSSLY
INTACT.  IV APPEARS INTACT, FLUIDS INFUSING WNL.  PT DENIES PAIN AND IS
ORIENTED TO SELF AND YEAR, IS CURRENTLY CALM/COOPERATIVE.  WARM BLANKETS
PROVIDED PER REQUEST, NO FURTHER REQUESTS AT THIS TIME.

## 2021-09-30 NOTE — NUR
PT ARRIVED FROM CCU BY BED. PT REQUESTS URINAL TO USE. PT ASSISTED WITH
URINAL. BLOOD PRESSURE ELEVATED, DR. SIMS UPDATED. IV ASSESSED, WNL, NO S/S OF
PHLEIBITS NOTED. PT DENIES PAIN AND NAUSEA AT THIS TIME. PT COMPLIANT WITH
CARES AND FOLLOWING DIRECTIONS AT THIS TIME. PT ORIENTED INCONSISTANTLY,
ABLE TO STATE MONTH AND YEAR AND THAT HE IS AT THE HOSPITAL, DOES NOT KNOW
WHAT TOWN HE IS IN OR WHY HE IS AT THE HOSPITAL. PT FOLLOW DIRECTIONS
INCONSISTANTLY. PT TALKS ABOUT HIS PENIS INNAPROPRATLY FOR SITUATION. PT
ORIENTED TO SELF. PT ABLE TO LIFT LEGS OFF THE BED, GENERALIZED WEAKNESS
CONTINUES. BED ALARM ON. LUNG SOUNDS CLEAR IN UPPER LOBES, DEMINISHED IN
BASES. RESPIRATIONS UNLABORD, PRODUCTIVE COUGH CONTINUES WITH THICK BROWN
SPUTUM. MD UPDATEDON PTS PRIOR PO INTAKE, ORDERS TO KEEP PT NPO, PT REMAINS
NPO AT THIS TIME. REDNESS NOTED AT MEATUS OF PENIS. WARM BLANKETS PROVIDED. PT
WATCHING TV. CALM AT THIS TIME. BED ALARM ON. CALL LIGHT WITHIN REACH.

## 2021-09-30 NOTE — NUR
THIS RN TO ROOM TO CHECK ON PT. PT SOILED AND BED SATURATED WITH URINE. LINENS
CHANGED, ORQUIDEA CARE DONE, DEPENDS CHANGED. PT REPOSITIONED TO RIGHT SIDE.
SUPPORETD WITH PILLOWS. WARM BLANKETS PROVIDED. PT AGITATED WITH CARES. COUGH
INCREASES WHEN HEAD OF BED IS LOWERED. HEAD OF BED REMAINS AT 24 DEGREES, PT
BECOMES VERY AGITATED WHEN HEAD OF BED IS ABOVE THIS ANGLE. PT DNEIES
ADDITIONAL REQUESTS OR COMPLAINTS. BED RAILS UP. CALL LIGHT WITHIN REACH. BED
ALARM ON.

## 2021-09-30 NOTE — NUR
PT ABLE TO STAND AT THE BEDSIDE WITH WALKER AND TWO PERSON ASSIST FOR ABOUT
20 SECONDS AT A TIME.  PT REFUSES BATH EXCEPT TO WASH HANDS WITH WASH CLOTH.
ALL LINENS CHANGED ON THE BED, PT IN CLEAN ATTENDS. PT IS AGREABLE TO BEING
ASSISTED TO LAY BACK IN BED.

## 2021-09-30 NOTE — NUR
PT MOVED TO ROOM 121 ON MED/SURG FLOOR VIA BED, ALL PERSONAL BELONGINGS WENT
WITH PT. PT EMA MOVE WELL. MODESTO MORRELL IN ROOM 121, ASSISTED W/ SETTLING THE
PT. CHART AND ALL KITS MEDS TAKEN TO MED/SURG.

## 2021-09-30 NOTE — NUR
BED ALARM SET OFF, PT TRYING TO GET UP TO VOID.  PT ASSISTED TO USE URINAL,
VOIDED 100ML AND HAD ALSO BEEN INCONTINENT OF LARGE AMOUNT OF URINE.  NEW
ATTENDS AND CHUX PROVIDED.  PT REQUESTS TO REMAIN SITTING AT SIDE OF BED.  BED
ALARM SET AND FLOOR MAT REMAINS IN FRONT OF PT.

## 2021-09-30 NOTE — NUR
IN TO CHECK ON PT, TEMP RECHECKED IS 98.2 AX. PT STATES HE NEED TO VOID,
HELPED HIM TO SIT UP AT EDGE OF BED, STATES HE FEELS TOO WEAK TO STAND. PT SAT
ON EDGE OF BED APPROX 5 MINUTES ATTEMPTING TO VOID INTO CYLINDER BUT WAS
UNABLE, ASSISTED BACK TO BED. DID HAVE SMALL INC URINE ON PADS. FRESH PAD
PLACED AND PT REPOSITIONED UP IN BED AND WANTING TO GO BACK TO SLEEP. BED
ALARM ON.

## 2021-09-30 NOTE — NUR
PT AGREEABLE TO LAYING BACK DOWN IN BED SO THAT PHLEBOTOMIST COULD DRAW BLOOD
FOR MORNING LABS, PT TOLERATED WELL.  OFFERED TO ASSIST PT BACK TO SITTING AT
SIDE OF BED, BUT HE DECLINED STATING THAT HE WAS COMFORTABLE.  ATTENDS REMAIN
DRY.  IV IS PATENT AND INFUSING WNL.  BED ALARM SET FOR SAFETY.

## 2021-09-30 NOTE — NUR
DR. ACUNA NOTIFIED OF PT'S POTASSIUM LEVEL ON LABS OF 2.8.  ORDERS RECEIVED
FOR 40 MEQ IVPB POTASSIUM REPLACEMENT.

## 2021-09-30 NOTE — NUR
PATIENT ASSESMENT COMPLETED. PATIENTS VITALS TAKEN AND RECORDED. PATIENT WAS
ABLE TO TAKE MEDICATIONS WITH CHOCOLATE PUDDING. PATIENT DID TRY AND SPIT
MEDICATIONS OUT. PATIENTS INTAKE AND OUPUT RECORDED. PATIENT IS SITTING ON THE
EDGE OF THE BED. BED ALARM IS ON FOR SAFETY. NO FURTHER NEEDS NOTED. NO S/S OF
ASPIRATION NOTED. CALL LIGHT IN REACH.

## 2021-09-30 NOTE — NUR
bed alarm set off, pt needs to void, tried to stand pt at bedside, pt unable
to use urinal, sat pt at edge of bed, pt c/o pain from the cold air, warm
blanket provided, pt able to void, 75mls, vitals done, pt wants to sit up at
the edge of the bed, rn in for meds, pt still sitting up, bed alarm on, in
view from rn station

## 2021-09-30 NOTE — NUR
PT REFUSING TO WORK WITH OCCUPATIONAL THERAPY AND MINIMALLY WITH SPEECH
THERAPY. PT REFUSES ENOXIPRIN. PT WILLING TO TAKE PILLS WITH SIPS OF COFFEE.
PT STATS "I NEED TO PEE" GIVEN A URINAL AND WALKER TO ASSIST WITH STANDING,
THEN PT REFUSES TO STAND AND SAYS "I DON'T HAVE TO PEE". PT REFUSES TO MOVE
FROM A SITTING POSITION ON THE SIDE OF THE BED. IV SITE IS INTACT, FLUIDS
INFUSE EASILY, PT DENIES PAIN AT THE SITE. PT DENIES PAIN IN GENERAL.

## 2021-09-30 NOTE — NUR
MULTIPLE ATTEMPTS TODAY TO REACH [facility].  PHONE SYSTEMS OUTAGE IN
AREA.  [facility] HEARD [facility].

## 2021-09-30 NOTE — NUR
PT SITTING UP HIGH IN THE BED, REQUESTS AND IS GIVEN FOOD. PT ABLE TO FEED
SELF TWO BITES OF FOOD AND THEN TAKES A BREAK, FOOD IS MINCED AND MOIST. PT
THEN COUGHS A BIT AND TAKES A FEW MORE BREAKS. PT ALSO ABLE TO EMA SOME SIPS
OF SODA. PT ATE APPROXIMATLY 50% OF MEAL.

## 2021-09-30 NOTE — NUR
CCU SHIFT SUMMRY NOTE
PT MOSTLY NON-COMPLIANT WITH INTERVENTIONS THIS SHIFT. PT REFUSES TO HAVE B/P
TAKEN, HAVE CARDIAC LEADS ON, AND SPO2 MONITOR. PT NOT WILLING TO WORK MUCH
WITH PHYSICAL THERAPY, OCCUPATIONAL THERAPY, OR SPEECH THERAPY. PT NOT ABLE TO
STAND AT THE BEDSIDE FOR MORE THAT 15-20 SECONDS, TWO PERSON ASSIST TO STAND
UP, AND WALKER USED FOR BALANCE. PT REFUSES BATH, ONLY WILLING TO WASH HIS
HANDS. ALL LINENS CHANGED ON HIS BED. PT IS WILLING TO TAKE PO PILLS AND IV
MEDICATIONS. PT SITS UP ON THE EDGE OF THE BED UNTIL ABOUT 1230, AND THEN IS
WILLING TO LET STAFF ASSIST HIM TO LAY IN BED. PT NOT ABLE TO USE CALL LIGHT
APPROPRIATLY.

## 2021-10-01 NOTE — NUR
AFTERNOON ASSESSMENT AND MEDICATION DUE. PT RESTING IN BED ON RIGHT SIDE WITH
EYES CLOSED. RESPIRATIONS EVEN AND UNALBORED. PT AWAKENS TO LIGHT TOUCH. PT
DENIES PAIN AND NAUSEA. PT CONTINUES TO BE ORIENTED ONLY TO SELF, HOSPITAL,
AND YEAR. PT CONTINUES TO STATE HE IS IN "[place]." PT COMPLIANT WITH CARES,
CALM AND COOPERATIVE BUT FOR GETFUL AT TIMES. SPEECH CONTINUES TO BE GARBLED.
PT ENCOURAGED TO GET UP TO CHAIR. PT DECLINES STATING HE WANTS TO STAY IN BED.
PT SOILED WITH URINE. ORQUIDEA CARE DONE, DEPENDS CHANGED. LUNG SOUNDS CLEAR
ALTHOUGH DEMINSHED IN BASES. OCCATIONAL COUGH CONTINUES. PT REPORTING HUNGER,
NPO EDUCATION DONE WITH AND PT IS ABLE TO REPEAT BACK REASON FOR NPO STATUS.
MEDICATIONS GIVEN WITH APPLESAUCE AND PT DENIES ADDITIONAL BITES OF
APPLESAUCE. PT ASSISTED WITH USING URAINAL, VOIDS 100ML CLEAR DARK YELLOW
URINE. IV ABX STARTED. PT RESTING IN BED WATCHING TV. BED RAILS UP. CALL LIGHT
WITHIN REACH.

## 2021-10-01 NOTE — NUR
Patient chose to sit in bed instead of get up to the chair. Melba care was done
with a CNA and an RN. Vitals and I&Os are complete. Patient is incontinent at
times. Patient uses urnal.

## 2021-10-01 NOTE — NUR
PATIENT ASSESMENT COMPLETED. PATIENT ASSISTED TO STAND AT THE BEDSIDE.
PATIENTS BEDDING CHANGE. PATIENTS GOWN CHANGED AND ATTEND PUT IN PLACE.
PATIENT IS BACK IN BED RESTING. PATIENTS BED ALARM ON FOR SAFETY. CALL LIGHT
IN REACH.

## 2021-10-01 NOTE — NUR
PUMP ALARMING, INFUSION AND FLUSH COMPLETE. IV ASSESSED, WNL, NO S/S PHLEBITIS
NOTED. IV FLUIDS RESTARTED. PT RESTING IN SEMIFOWLER POSITION WITH HEAD OF BED
ELEVATED TO 30 DEGREES WITH EYES CLOSED. RESPIRATIONS EVEN AND UNALBORED. NO
ADDITIONAL REQUESTS OR COMPLAINTS. CALL LIGHT WITHIN REACH. BED RAILS UP. BED
ALARM ON.

## 2021-10-01 NOTE — NUR
PATIENT IS RESTING IN BED WITH EYES CLOSED, RR 16. CALL LIGHT IN REACH. BED
ALARM ON FOR SAFETY. CALL LIGHT IN REACH.

## 2021-10-01 NOTE — NUR
TRANSPORT [facility] ARRIVED. REPORT GIVEN TO JENI OH WHO STATES HIS
QUESTIONS HAVE BEEN ANSWERED. PT TRANSPORTED TO [facility] WITH 4 PERSON
ASSIST AND DISCHARGED. DR. ACUNA ALSO SPOKE WITH JENI OH.

## 2021-10-01 NOTE — NUR
THIS RN TO ROOM TO CHECK ON PT. PT REPOSITIONED TO RIGHT SIDE, SUPPORTED WITH
PILLOWS. PT RESTING WITH EYES CLOSED, RESPIRATIONS EVEN AND UNALBORED. BED
RAILS UP. BED ALARM ON. CALL LIGHT WITHIN REACH.

## 2021-10-01 NOTE — NUR
MORNING ASSESSMENT AND MEDICATION DUE. PT RESTING IN BED WITH EYES CLOSED,
RESPIARTIONS EVEN AND UNLABORED. HEAD OF BED ELEVATED TO 29 DEGREES. PT
AWAKENS TO VOICE AND LIGHT TOUCH. PT CONFIRMS THAT HE IS HAVING PAIN BUT IS
UNABLE TO RATE PAIN. FLACC SCORE OF 4/10. SCHEDULED MEDICATIONS GIVEN.
PHARMACIST, MICHAEL, CALLED AND STATES MORNING MEDICATIONS CAN BE CRUSHED. PT
TAKES MEDICATIONS CRUSHED IN PUDDING. IV ASSESSED, WNL, NO S/S PHLEBITIS
NOTED. PT ORIENTED TO SELF AND KNOWS HE IS IN THE HOSPITAL. PT ALSO KNOWS
YEAR. PT DISORINTED TO DATE/MONTH, EVENTS, REASON HE IS IN THE HOSPITAL, AND
TOWN STATING HE IS IN "[place]." OCCATIONAL PRODUCTIVE COUGH
NOTED, GONZALEZ SPUTUM.  LUNG SOUNDS CLEAR. PT ASSISTED WITH URINAL USE. PEES IN
BED AS WELL, UNAWARE OF WHERE URINAL IS OR WHEN TO BEGIN VOIDING. BLOOD
PRESSURE REMAINS ELEVATED. CLONIDINE PATCH IN PLACE. MD AWARE. PT REPOSITIONED
IN BED. HEAD OF BED ELEVATED TO 30 DEGREES. PT WATCHING TV. BED RAILS UP. CALL
LIGHT WITHIN REACH. BED ALARM ON.
IN UPPER LOBES AND DEMISHED IN BASES. Yes

## 2021-10-01 NOTE — NUR
PT READY FOR TRANSFER TO [facility]. PT UPDATED ON PLAN OF CARE AND IS ABLE
TO REPEAT BACK PLAN OF CARE. PT CONTINUES TO STATE HE IS HUNGRY, NPO EDUCAITON
DONE WITH PT AGAIN, PT VERBALIZES UNDERSTANDING. IV SALINE LOCKED FOR
TRANSPORT. PT DOES NOT HAVE CLOTHES WITH HIM AND WILL TRANSFER IN GOWN.
BELONGINGS PACKED FOR PT. AWAITING TRANSPORT [facility]. BED RAILS UP. CALL
LIGHT WIHTIN REACH. BED ALARM ON.

## 2021-10-01 NOTE — NUR
[facility] CALLED TO GIVE REPORT ON PT PIROR TO TRANSFER. PTS ACCEPTING RN
OCCUPIED AND WILL CALL BACK.

## 2021-10-01 NOTE — NUR
REPORT RECEIVED FROM PETROS TOUSSAINT. PT RESTING IN BED ON BACK WITH EYES CLOSED.
HEAD OF BED ELEVATED TO 15 DEGREES. BED RAILS UP. CALL LIGHT WITHIN REACH. PT
ALLOWED TO REST. PT EASILY VIEWED FROM NURSES STATION, BED ALARM ON.

## 2021-10-01 NOTE — NUR
THIS RN TO ROOM TO CHECK ON PT. PT RESTING IN BED WITH EYES CLOSED,
RESPIRATIONS EVEN AND UNALBORED. BED RAILS UP. CALL LIGHT WITHIN REACH. BED
ALARM ON. PT ALLOWED TO REST.

## 2021-10-01 NOTE — NUR
Multiple calls have now been made between our facility, [facility], and [facility].  Dr. Rex Rice has agreed to take Tani at the [facility].
Wife Gissel has been made aware of the arrangements that have been made.  [facility], anticipate transport
at apprimately 5:30 this evening.

## 2021-11-18 ENCOUNTER — HOSPITAL ENCOUNTER (EMERGENCY)
Dept: HOSPITAL 46 - ED | Age: 71
LOS: 35 days | Discharge: HOME | End: 2021-12-23
Payer: MEDICARE

## 2021-11-18 VITALS — BODY MASS INDEX: 23.78 KG/M2 | HEIGHT: 66 IN | WEIGHT: 148 LBS

## 2021-11-18 DIAGNOSIS — Z79.899: ICD-10-CM

## 2021-11-18 DIAGNOSIS — F11.23: Primary | ICD-10-CM

## 2021-11-18 PROCEDURE — A9270 NON-COVERED ITEM OR SERVICE: HCPCS

## 2021-11-18 PROCEDURE — U0003 INFECTIOUS AGENT DETECTION BY NUCLEIC ACID (DNA OR RNA); SEVERE ACUTE RESPIRATORY SYNDROME CORONAVIRUS 2 (SARS-COV-2) (CORONAVIRUS DISEASE [COVID-19]), AMPLIFIED PROBE TECHNIQUE, MAKING USE OF HIGH THROUGHPUT TECHNOLOGIES AS DESCRIBED BY CMS-2020-01-R: HCPCS

## 2021-12-27 NOTE — NUR
PATIENT BED ALARM ACTIVATED AGAIN. PATIENT HAS REFUSED TO LIE DOWN, PATIENT
WAS SUPERVISED SITTING ON SIDE OF THE BED FROM 2255 UNTIL 0030. FALL MAT
PROVIDED. ROOM TIDIED, PATIENT CONTINUES TO REFUSE TO LIE DOWN. PROVIDER:[TOKEN:[[id number]]]

## 2023-08-11 NOTE — NUR
DEXMEDETOMIDINE GTT AT 0.4 MCG/KG/HR HUNG. Hydroxychloroquine Counseling:  I discussed with the patient that a baseline ophthalmologic exam is needed at the start of therapy and every year thereafter while on therapy. A CBC may also be warranted for monitoring.  The side effects of this medication were discussed with the patient, including but not limited to agranulocytosis, aplastic anemia, seizures, rashes, retinopathy, and liver toxicity. Patient instructed to call the office should any adverse effect occur.  The patient verbalized understanding of the proper use and possible adverse effects of Plaquenil.  All the patient's questions and concerns were addressed.